# Patient Record
Sex: MALE | Race: WHITE
[De-identification: names, ages, dates, MRNs, and addresses within clinical notes are randomized per-mention and may not be internally consistent; named-entity substitution may affect disease eponyms.]

---

## 2019-03-19 ENCOUNTER — HOSPITAL ENCOUNTER (EMERGENCY)
Dept: HOSPITAL 74 - JERFT | Age: 39
Discharge: HOME | End: 2019-03-19
Payer: SELF-PAY

## 2019-03-19 VITALS — BODY MASS INDEX: 35.2 KG/M2

## 2019-03-19 VITALS — SYSTOLIC BLOOD PRESSURE: 139 MMHG | HEART RATE: 87 BPM | DIASTOLIC BLOOD PRESSURE: 78 MMHG | TEMPERATURE: 98.7 F

## 2019-03-19 DIAGNOSIS — Z48.02: ICD-10-CM

## 2019-03-19 DIAGNOSIS — Z48.817: Primary | ICD-10-CM
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
## 2019-03-19 NOTE — PDOC
History of Present Illness





- General


Chief Complaint: Suture/Staple Removal (other)


Stated Complaint: REMOVE STITCHES


Time Seen by Provider: 03/19/19 15:58





- History of Present Illness


Initial Comments: 





03/19/19 16:46


38-year-old male presents for suture removal from his scalp sutures were placed 

10 days ago at [facility].





Past History





- Past Medical History


Allergies/Adverse Reactions: 


 Allergies











Allergy/AdvReac Type Severity Reaction Status Date / Time


 


No Known Allergies Allergy   Verified 03/19/19 15:59











Home Medications: 


Ambulatory Orders





NK [No Known Home Medication]  03/19/19 











- Suicide/Smoking/Psychosocial Hx


Smoking History: Never smoked





**Review of Systems





- Review of Systems


Constitutional: Yes: See HPI





*Physical Exam





- Vital Signs


 Last Vital Signs











Temp Pulse Resp BP Pulse Ox


 


 98.7 F   87   16   139/78   99 


 


 03/19/19 16:06  03/19/19 16:06  03/19/19 16:06  03/19/19 16:06  03/19/19 16:06














- Physical Exam


Comments: 





03/19/19 16:46


There is about a 5 cm laceration which was repaired with Prolene in a running 

fashion on the left parietal scalp no surrounding erythema warmth or 

induration. There is a small hematoma adjacent to the wound. There is a large 

eschar in place. The wound is well-healed the sutures are oriented in baseball 

stitch fashion.





Moderate Sedation





- Procedure Monitoring


Vital Signs: 


Procedure Monitoring Vital Signs











Temperature  98.7 F   03/19/19 16:06


 


Pulse Rate  87   03/19/19 16:06


 


Respiratory Rate  16   03/19/19 16:06


 


Blood Pressure  139/78   03/19/19 16:06


 


O2 Sat by Pulse Oximetry (%)  99   03/19/19 16:06











Medical Decision Making





- Medical Decision Making





03/19/19 16:47


Prolene sutures were removed using a needle  and suture scissors they 

were locked in a baseball fashion this was tolerated well.





*DC/Admit/Observation/Transfer


Diagnosis at time of Disposition: 


 Visit for suture removal








- Discharge Dispostion


Disposition: HOME


Condition at time of disposition: Stable


Decision to Admit order: No





- Referrals





- Patient Instructions


Printed Discharge Instructions:  DI for Suture Removal


Additional Instructions: 


Return to the emergency room should you experience any drainage or increasing 

pain or swelling to the area. Keep the wound clean and dry for the next 48 

hours. After 48 hours and may wash with soap and water and leave the area open 

to air. Do not take off the scab.





- Post Discharge Activity

## 2024-09-04 ENCOUNTER — HOSPITAL ENCOUNTER (EMERGENCY)
Dept: HOSPITAL 74 - JER | Age: 44
Discharge: HOME | End: 2024-09-04
Payer: COMMERCIAL

## 2024-09-04 VITALS
RESPIRATION RATE: 18 BRPM | TEMPERATURE: 98.9 F | SYSTOLIC BLOOD PRESSURE: 133 MMHG | HEART RATE: 99 BPM | DIASTOLIC BLOOD PRESSURE: 93 MMHG

## 2024-09-04 VITALS — BODY MASS INDEX: 37.8 KG/M2

## 2024-09-04 DIAGNOSIS — M25.461: Primary | ICD-10-CM

## 2024-09-04 DIAGNOSIS — M17.11: ICD-10-CM

## 2024-09-04 PROCEDURE — 3E0133Z INTRODUCTION OF ANTI-INFLAMMATORY INTO SUBCUTANEOUS TISSUE, PERCUTANEOUS APPROACH: ICD-10-PCS

## 2024-09-04 RX ADMIN — ACETAMINOPHEN ONE MG: 500 TABLET, FILM COATED ORAL at 18:36

## 2024-09-04 RX ADMIN — KETOROLAC TROMETHAMINE ONE MG: 30 INJECTION INTRAMUSCULAR; INTRAVENOUS at 18:36
